# Patient Record
(demographics unavailable — no encounter records)

---

## 2025-05-19 NOTE — HISTORY OF PRESENT ILLNESS
[FreeTextEntry1] : YAAKOV TRIVEDI 57 year, , postmenopausal since 2018, h/o squamous cell skin ca, basal cell skin ca, osteopenia, Ulcerative colitis, presents for annual GYN exam.   She denies abdominal and pelvic pain. No vaginal discharge or vaginitis symptoms. No urinary complaints. BM is normal per patient.  Obhx: G1-  c/s G2- c/s  GYNhx: h/o ovarian cyst, h/o em polyp s/p D&C hysteroscopy polypectomy (), denies fibroids, abl paps, STIs  PMH: squamous cell skin ca, basal cell skin ca, osteopenia, Ulcerative colitis  PSH: tonsillectomy, removal of benign tumor on L leg, cs x 2, inguinal hernia, MOHs, EM polypectomy (2022), D&C hysteroscopy  Med: mesalamine, paxil, omeprazole  All: seafood-rash, shellfish-rash, NKDA  Soc: denies T/E/D  Psych: anxiety  Famhx: mother-breast ca, , skin ca, t2dm, HTN, HLD, basal cell carcinoma; father HTN, dementia, HLD, PGF colon ca. Denies FHx of ovarian, or uterine cancer. [TextBox_4] : Breast MRI 9/2023: No MRI evidence of breast malignancy. Nonspecific 3 mm enhancing skin lesion upper inner left breast. RECOMMENDATION:  Resume Annual Mammography on Schedule.  **Pt is s/p dermatology consult and skin bx-reports benign result BI-RADS 2 - Benign Finding(s) [Mammogramdate] : 4/2024 [BreastSonogramDate] : 4/2024 [TextBox_19] : BIRADS 2 [TextBox_25] : BIRADS 2 [PapSmeardate] : 4/2024 [TextBox_31] : NILM, HRHPV neg [BoneDensityDate] : 2023 [TextBox_37] : osteopenia [ColonoscopyDate] : 2/2025 [TextBox_43] : UTD

## 2025-05-19 NOTE — PLAN
[FreeTextEntry1] : #HCM -Breast self exam reviewed and taught -STI Screening declined -Pap/HPV today -Mammogram/breast US rx given -Colonoscopy UTD last 2/2025 -Immunizations: UTD  #osteopenia -DEXA bone density due now -Recommend adequate dietary calcium intake (1000 mg/day for those 19-49yo and 1200 mg/day in older women) and vitamin D supplementation (600 IU/day <69yo, 800 IU/day 71 yo and older ) -Increase weight bearing exercise for 30 min at least 3x weekly  #h/o em polyps TVUS ordered  RTO in 1 year for annual GYN exam or PRN for any GYN complaints GILBERT Wild MD

## 2025-05-19 NOTE — PHYSICAL EXAM
[Appropriately responsive] : appropriately responsive [Alert] : alert [No Acute Distress] : no acute distress [Soft] : soft [Non-tender] : non-tender [Non-distended] : non-distended [No HSM] : No HSM [No Lesions] : no lesions [No Mass] : no mass [Oriented x3] : oriented x3 [Examination Of The Breasts] : a normal appearance [No Masses] : no breast masses were palpable [Vulvar Atrophy] : vulvar atrophy [Labia Majora] : normal [Labia Minora] : normal [Atrophy] : atrophy [Normal] : normal [Uterine Adnexae] : normal [FreeTextEntry9] : deferred